# Patient Record
Sex: MALE | Race: OTHER
[De-identification: names, ages, dates, MRNs, and addresses within clinical notes are randomized per-mention and may not be internally consistent; named-entity substitution may affect disease eponyms.]

---

## 2018-03-29 ENCOUNTER — HOSPITAL ENCOUNTER (EMERGENCY)
Dept: HOSPITAL 94 - ER | Age: 23
Discharge: HOME | End: 2018-03-29
Payer: MEDICAID

## 2018-03-29 VITALS — SYSTOLIC BLOOD PRESSURE: 118 MMHG | DIASTOLIC BLOOD PRESSURE: 61 MMHG

## 2018-03-29 VITALS — HEIGHT: 68 IN | BODY MASS INDEX: 21.22 KG/M2 | WEIGHT: 139.99 LBS

## 2018-03-29 DIAGNOSIS — M79.672: Primary | ICD-10-CM

## 2018-03-29 PROCEDURE — 73630 X-RAY EXAM OF FOOT: CPT

## 2018-03-29 PROCEDURE — 99284 EMERGENCY DEPT VISIT MOD MDM: CPT

## 2023-01-13 ENCOUNTER — OFFICE VISIT (OUTPATIENT)
Dept: URGENT CARE | Facility: PHYSICIAN GROUP | Age: 28
End: 2023-01-13
Payer: COMMERCIAL

## 2023-01-13 VITALS
RESPIRATION RATE: 15 BRPM | HEIGHT: 68 IN | WEIGHT: 131 LBS | OXYGEN SATURATION: 98 % | DIASTOLIC BLOOD PRESSURE: 64 MMHG | TEMPERATURE: 98.2 F | BODY MASS INDEX: 19.85 KG/M2 | HEART RATE: 81 BPM | SYSTOLIC BLOOD PRESSURE: 100 MMHG

## 2023-01-13 DIAGNOSIS — R10.9 ABDOMINAL CRAMPING: ICD-10-CM

## 2023-01-13 DIAGNOSIS — R19.7 DIARRHEA, UNSPECIFIED TYPE: ICD-10-CM

## 2023-01-13 DIAGNOSIS — R11.2 NAUSEA AND VOMITING, UNSPECIFIED VOMITING TYPE: ICD-10-CM

## 2023-01-13 PROCEDURE — 99203 OFFICE O/P NEW LOW 30 MIN: CPT | Performed by: PHYSICIAN ASSISTANT

## 2023-01-13 RX ORDER — ONDANSETRON 4 MG/1
4 TABLET, ORALLY DISINTEGRATING ORAL EVERY 6 HOURS PRN
Qty: 15 TABLET | Refills: 0 | Status: SHIPPED | OUTPATIENT
Start: 2023-01-13 | End: 2023-03-25

## 2023-01-13 NOTE — LETTER
January 13, 2023    To Whom It May Concern:         This is confirmation that Samia Rodriguez attended his scheduled appointment with Terry Mejia P.A.-C. on 1/13/23. Please excuse him on 1/13/23 from work. He may return to work next scheduled shift.          If you have any questions please do not hesitate to call me at the phone number listed below.     Sincerely,        Terry Mejia P.A.-C.   [phone]

## 2023-01-14 NOTE — PROGRESS NOTES
"Subjective:   Samia Rodriguez is a 27 y.o. male who presents for Other (GI issues since Wednesday. Pt states that he has had gas and abnormal bowel movements. Pt states that he has been vomiting after eating. )      HPI  The patient presents to the Urgent Care with GI symptoms onset 2 days ago.  Symptoms started with increased gas and a churning sensation to his abdomen.  Frequent visits to the toilet and bowel movements soft small pieces that are not liquid.  Denies any known bloody or black stools.  He had some nausea and vomiting twice last night after eating a burger.  He still has an appetite.  He tolerated fluids well.  Pepto-Bismol helps. Denies any SOB, bloody or black stools. No recent travel. Denies any pertinent past medical history. Denies any abdominal surgeries.       Medications:    This patient does not have an active medication from one of the medication groupers.    Allergies: Patient has no known allergies.    Problem List: Samia Rodriguez does not have a problem list on file.    Surgical History:  No past surgical history on file.    Past Social Hx: Samia Rodriguez  reports that he has been smoking cigarettes. He started smoking about 4 years ago. He has never used smokeless tobacco. He reports current alcohol use. He reports current drug use. Drugs: Marijuana and Inhaled.     Past Family Hx:  Samia Rodriguez family history is not on file.     Problem list, medications, and allergies reviewed by myself today in Epic.     Objective:     /64 (BP Location: Left arm, Patient Position: Sitting, BP Cuff Size: Adult)   Pulse 81   Temp 36.8 °C (98.2 °F) (Temporal)   Resp 15   Ht 1.727 m (5' 8\")   Wt 59.4 kg (131 lb)   SpO2 98%   BMI 19.92 kg/m²     Physical Exam  Vitals reviewed.   Constitutional:       General: He is not in acute distress.     Appearance: Normal appearance. He is not ill-appearing or toxic-appearing.   HENT:      Mouth/Throat:      Mouth: Mucous membranes are moist.      " Pharynx: Oropharynx is clear.   Eyes:      Conjunctiva/sclera: Conjunctivae normal.      Pupils: Pupils are equal, round, and reactive to light.   Cardiovascular:      Rate and Rhythm: Normal rate and regular rhythm.      Heart sounds: Normal heart sounds.   Pulmonary:      Effort: Pulmonary effort is normal.      Breath sounds: Normal breath sounds.   Abdominal:      General: Abdomen is flat. Bowel sounds are normal. There is no distension.      Palpations: There is no hepatomegaly, splenomegaly or mass.      Tenderness: There is abdominal tenderness (mild) in the left upper quadrant. There is no right CVA tenderness, left CVA tenderness, guarding or rebound. Negative signs include Mcdermott's sign and McBurney's sign.       Musculoskeletal:      Cervical back: Neck supple. No rigidity.   Lymphadenopathy:      Cervical: No cervical adenopathy.   Skin:     General: Skin is warm and dry.   Neurological:      General: No focal deficit present.      Mental Status: He is alert and oriented to person, place, and time.   Psychiatric:         Mood and Affect: Mood normal.         Behavior: Behavior normal.       Diagnosis and associated orders:     1. Nausea and vomiting, unspecified vomiting type  - ondansetron (ZOFRAN ODT) 4 MG TABLET DISPERSIBLE; Take 1 Tablet by mouth every 6 hours as needed for Nausea/Vomiting.  Dispense: 15 Tablet; Refill: 0    2. Diarrhea, unspecified type    3. Abdominal cramping       Comments/MDM:     Patient's presenting symptoms and exam findings consistent most likely with a viral gastroenteritis.  Differential diagnosis include but not limited to constipation, IBS, colitis, appendicitis.  Patient overall is well-appearing in no acute distress.  No peritoneal signs.  Afebrile.  Tolerating fluids.  Suspicion for acute emergent pathology is low.  Recommend symptomatic and supportive care at this time as well as Zofran for any nausea or vomiting.  Increase fluid intake, bland/brat diet and increase  as tolerated.  Patient understands to present immediately to the ER for any severe abdominal pain, continuous vomiting, unable to tolerate fluids, or any other concerns.       I personally reviewed prior external notes and test results pertinent to today's visit. Pathogenesis of diagnosis discussed including typical length and natural progression. Supportive care, natural history, differential diagnoses, and indications for immediate follow-up discussed. Patient expresses understanding and agrees to plan. Patient denies any other questions or concerns.     Follow-up with the primary care physician for recheck, reevaluation, and consideration of further management.    Please note that this dictation was created using voice recognition software. I have made a reasonable attempt to correct obvious errors, but I expect that there are errors of grammar and possibly content that I did not discover before finalizing the note.    This note was electronically signed by Terry Mejia PA-C

## 2023-01-26 ENCOUNTER — HOSPITAL ENCOUNTER (OUTPATIENT)
Facility: MEDICAL CENTER | Age: 28
End: 2023-01-26
Attending: PHYSICIAN ASSISTANT
Payer: COMMERCIAL

## 2023-01-26 ENCOUNTER — OFFICE VISIT (OUTPATIENT)
Dept: URGENT CARE | Facility: PHYSICIAN GROUP | Age: 28
End: 2023-01-26
Payer: COMMERCIAL

## 2023-01-26 VITALS
SYSTOLIC BLOOD PRESSURE: 102 MMHG | DIASTOLIC BLOOD PRESSURE: 64 MMHG | BODY MASS INDEX: 19.85 KG/M2 | WEIGHT: 131 LBS | TEMPERATURE: 97.6 F | HEART RATE: 101 BPM | OXYGEN SATURATION: 96 % | HEIGHT: 68 IN | RESPIRATION RATE: 16 BRPM

## 2023-01-26 DIAGNOSIS — R13.10 PAINFUL SWALLOWING: ICD-10-CM

## 2023-01-26 LAB
INT CON NEG: NORMAL
INT CON POS: NORMAL
S PYO AG THROAT QL: NEGATIVE

## 2023-01-26 PROCEDURE — 99213 OFFICE O/P EST LOW 20 MIN: CPT | Performed by: PHYSICIAN ASSISTANT

## 2023-01-26 PROCEDURE — 87070 CULTURE OTHR SPECIMN AEROBIC: CPT

## 2023-01-26 PROCEDURE — 87880 STREP A ASSAY W/OPTIC: CPT | Performed by: PHYSICIAN ASSISTANT

## 2023-01-26 ASSESSMENT — ENCOUNTER SYMPTOMS
PALPITATIONS: 0
COUGH: 0
FEVER: 0
DIZZINESS: 0
HEADACHES: 1
CHILLS: 0
SORE THROAT: 1
BLURRED VISION: 0
SHORTNESS OF BREATH: 0
DOUBLE VISION: 0

## 2023-01-26 NOTE — LETTER
January 26, 2023         Patient: Samia Rodriguez   YOB: 1995   Date of Visit: 1/26/2023           To Whom it May Concern:    Samia Rodriguez was seen in my clinic on 1/26/2023.  Please excuse patient's absence today.    If you have any questions or concerns, please don't hesitate to call.        Sincerely,           Carlin Ferrera P.A.-C.  Electronically Signed

## 2023-01-27 DIAGNOSIS — R13.10 PAINFUL SWALLOWING: ICD-10-CM

## 2023-01-27 NOTE — PROGRESS NOTES
"  Subjective:   Samia Rodriguez is a 27 y.o. male who presents today with   Chief Complaint   Patient presents with    Neck Pain     Neck pain? Pain when swallowing, pain radiates to back of head, started this morning      Pharyngitis   This is a new problem. The current episode started today. The problem has been unchanged. There has been no fever. Associated symptoms include headaches. Pertinent negatives include no coughing or shortness of breath. He has tried nothing for the symptoms. The treatment provided no relief.   Patient notes that the pain is worse when he swallows.  Patient denies any recent injury or trauma.    PMH:  has no past medical history on file.  MEDS:   Current Outpatient Medications:     ondansetron (ZOFRAN ODT) 4 MG TABLET DISPERSIBLE, Take 1 Tablet by mouth every 6 hours as needed for Nausea/Vomiting. (Patient not taking: Reported on 1/26/2023), Disp: 15 Tablet, Rfl: 0  ALLERGIES: No Known Allergies  SURGHX: History reviewed. No pertinent surgical history.  SOCHX:  reports that he has been smoking cigarettes. He started smoking about 4 years ago. He has never used smokeless tobacco. He reports current alcohol use. He reports current drug use. Drugs: Marijuana and Inhaled.  FH: Reviewed with patient, not pertinent to this visit.       Review of Systems   Constitutional:  Negative for chills and fever.   HENT:  Positive for sore throat.    Eyes:  Negative for blurred vision and double vision.   Respiratory:  Negative for cough and shortness of breath.    Cardiovascular:  Negative for chest pain and palpitations.   Neurological:  Positive for headaches. Negative for dizziness.      Objective:   /64 (BP Location: Right arm, Patient Position: Sitting, BP Cuff Size: Adult)   Pulse (!) 101   Temp 36.4 °C (97.6 °F) (Temporal)   Resp 16   Ht 1.727 m (5' 8\")   Wt 59.4 kg (131 lb)   SpO2 96%   BMI 19.92 kg/m²   Physical Exam  Vitals and nursing note reviewed.   Constitutional:       " General: He is not in acute distress.     Appearance: Normal appearance. He is well-developed. He is not ill-appearing or toxic-appearing.   HENT:      Head: Normocephalic and atraumatic.      Right Ear: Hearing normal.      Left Ear: Hearing normal.      Mouth/Throat:      Dentition: Normal dentition. No dental tenderness, gingival swelling or dental abscesses.      Pharynx: Uvula midline. Posterior oropharyngeal erythema present. No uvula swelling.      Tonsils: No tonsillar exudate or tonsillar abscesses. 1+ on the right. 2+ on the left.      Comments: No signs of abscess to the inside of his mouth.  Neck:      Comments: Mild pain with movement to the right.  No pulsating mass noted to the neck.  Cardiovascular:      Rate and Rhythm: Regular rhythm.      Heart sounds: Normal heart sounds.   Pulmonary:      Effort: Pulmonary effort is normal.   Musculoskeletal:      Cervical back: Normal range of motion. No spinous process tenderness or muscular tenderness.      Comments: Normal movement in all 4 extremities   Lymphadenopathy:      Head:      Left side of head: Tonsillar adenopathy present.   Skin:     General: Skin is warm and dry.   Neurological:      General: No focal deficit present.      Mental Status: He is alert and oriented to person, place, and time.      GCS: GCS eye subscore is 4. GCS verbal subscore is 5. GCS motor subscore is 6.      Cranial Nerves: No dysarthria or facial asymmetry.      Motor: Motor function is intact.      Coordination: Coordination is intact. Coordination normal.   Psychiatric:         Mood and Affect: Mood normal.     STREP A -    Assessment/Plan:   Assessment    1. Painful swallowing  - POCT Rapid Strep A  - CULTURE THROAT; Future  Symptoms and presentation appear to be consistent with viral pharyngitis and would recommend over-the-counter use of ibuprofen or Tylenol per 's instructions as well as salt water gargles and lozenges.  We will follow-up with throat  culture and treat accordingly if needed at that time.    Differential diagnosis, natural history, supportive care, and indications for immediate follow-up discussed.   Patient given instructions and understanding of medications and treatment.    If not improving in 3-5 days, F/U with PCP or return to UC if symptoms worsen.    Patient agreeable to plan.      Please note that this dictation was created using voice recognition software. I have made every reasonable attempt to correct obvious errors, but I expect that there are errors of grammar and possibly content that I did not discover before finalizing the note.    Carlin Ferrera PA-C

## 2023-01-29 LAB
BACTERIA SPEC RESP CULT: NORMAL
SIGNIFICANT IND 70042: NORMAL
SITE SITE: NORMAL
SOURCE SOURCE: NORMAL

## 2023-03-24 ENCOUNTER — OFFICE VISIT (OUTPATIENT)
Dept: URGENT CARE | Facility: CLINIC | Age: 28
End: 2023-03-24
Payer: COMMERCIAL

## 2023-03-24 VITALS
DIASTOLIC BLOOD PRESSURE: 80 MMHG | HEIGHT: 68 IN | WEIGHT: 136.4 LBS | HEART RATE: 72 BPM | TEMPERATURE: 98.4 F | SYSTOLIC BLOOD PRESSURE: 106 MMHG | BODY MASS INDEX: 20.67 KG/M2 | OXYGEN SATURATION: 96 %

## 2023-03-24 DIAGNOSIS — K92.1 BLOODY STOOLS: ICD-10-CM

## 2023-03-24 DIAGNOSIS — K59.00 CONSTIPATION, UNSPECIFIED CONSTIPATION TYPE: ICD-10-CM

## 2023-03-24 PROCEDURE — 99214 OFFICE O/P EST MOD 30 MIN: CPT | Performed by: NURSE PRACTITIONER

## 2023-03-24 RX ORDER — BIOTIN 10 MG
TABLET ORAL
COMMUNITY
End: 2023-07-06

## 2023-03-24 RX ORDER — MULTIVIT WITH MINERALS/LUTEIN
TABLET ORAL
COMMUNITY

## 2023-03-24 NOTE — LETTER
March 24, 2023         Patient: Samia Rodriguez   YOB: 1995   Date of Visit: 3/24/2023           To Whom it May Concern:    Samia Rodriguez was seen in my clinic on 3/24/2023. He may return to work on 3/26/23.    If you have any questions or concerns, please don't hesitate to call.        Sincerely,           RUIZ Hernandez.  Electronically Signed

## 2023-03-25 ASSESSMENT — ENCOUNTER SYMPTOMS
BACK PAIN: 0
FEVER: 0
NAUSEA: 0
DIARRHEA: 0
CONSTIPATION: 1
CHILLS: 0
COUGH: 0
HEARTBURN: 0
ABDOMINAL PAIN: 0
BLOOD IN STOOL: 1
FLANK PAIN: 0
VOMITING: 0

## 2023-03-25 NOTE — PROGRESS NOTES
Subjective:   Samia Rodriguez is a 27 y.o. male who presents for Stool Color Change (The patient stated he hasn't been eating very much lately, mostly due to the lack of interest. Today he had a bowel movement and noticed blood.  There was no blood in the actual toilet water. He did say that he has to maneuver himself a bit to have a bowel movement.)      HPI  Patient is a 27-year-old male presents urgent care for evaluation after he noticed blood in his stools today after a bowel movement.  Patient states that the blood did appear to be darker in nature.  He does admit that he is struggles with intermittent constipation and does have straining with bowel movements at times.  He denies any fever or chills.  He denies any history of anemia, does not bruise easily.  Did not feel dizzy, fatigued.  Patient denies any chest pain or palpitations.  Denies any acute abdominal tenderness.  Denies any nausea or vomiting.  He does note that over the past couple days he is not wanting to eat or drink much has lack of interest of eating.  Patient has no history of hemorrhoids.  Denies pain with defecation.  Denies any anal itching  Review of Systems   Constitutional:  Negative for chills and fever.   HENT:  Negative for congestion.    Respiratory:  Negative for cough.    Cardiovascular:  Negative for chest pain.   Gastrointestinal:  Positive for blood in stool and constipation. Negative for abdominal pain, diarrhea, heartburn, melena, nausea and vomiting.   Genitourinary:  Negative for dysuria and flank pain.   Musculoskeletal:  Negative for back pain.   Skin:  Negative for rash.     Medications:    Biotin Tabs  Vitamin C Tabs    Allergies: Patient has no known allergies.    Problem List: Samia Rodriguez does not have a problem list on file.    Surgical History:  No past surgical history on file.    Past Social Hx: Samia Rodriguez  reports that he has been smoking cigarettes. He started smoking about 4 years ago. He has never  "used smokeless tobacco. He reports current alcohol use. He reports current drug use. Drugs: Marijuana and Inhaled.     Past Family Hx:  Samia Rodriguez family history is not on file.     Problem list, medications, and allergies reviewed by myself today in Epic.     Objective:     /80 (BP Location: Right arm, Patient Position: Sitting, BP Cuff Size: Adult)   Pulse 72   Temp 36.9 °C (98.4 °F) (Temporal)   Ht 1.727 m (5' 8\")   Wt 61.9 kg (136 lb 6.4 oz)   SpO2 96%   BMI 20.74 kg/m²     Physical Exam  Vitals and nursing note reviewed.   Constitutional:       General: He is not in acute distress.     Appearance: He is well-developed.   HENT:      Head: Normocephalic and atraumatic.      Right Ear: External ear normal.      Left Ear: External ear normal.      Nose: Nose normal.      Mouth/Throat:      Mouth: Mucous membranes are moist.   Eyes:      Conjunctiva/sclera: Conjunctivae normal.   Cardiovascular:      Rate and Rhythm: Normal rate.   Pulmonary:      Effort: Pulmonary effort is normal. No respiratory distress.      Breath sounds: Normal breath sounds.   Abdominal:      General: There is no distension.   Genitourinary:     Rectum: Normal.   Musculoskeletal:         General: Normal range of motion.   Skin:     General: Skin is warm and dry.   Neurological:      General: No focal deficit present.      Mental Status: He is alert and oriented to person, place, and time. Mental status is at baseline.      Gait: Gait (gait at baseline) normal.   Psychiatric:         Judgment: Judgment normal.       Assessment/Plan:     Diagnosis and associated orders:     1. Bloody stools  Referral to Gastroenterology    OCCULT BLOOD FECES IMMUNOASSAY      2. Constipation, unspecified constipation type           Comments/MDM:     I personally reviewed prior external notes and prior test results pertinent to today's visit.  On exam no acute findings, did recommend dietary modifications, recommend over-the-counter stool " softeners.  We will evaluate occult blood to rule out GI bleed.  Patient will be referred to gastroenterology.  Discussed management options, risks and benefits, and alternatives to treatment plan agreed upon.   Red flags discussed and indications to immediately call 911 or present to the Emergency Department.   Supportive care, differential diagnoses, and indications for immediate follow-up discussed with patient.    Patient expresses understanding and agrees to plan. Patient denies any other questions or concerns.            My total time spent caring for the patient on the day of the encounter was 31 minutes.   This does not include time spent on separately billable procedures/tests.    Please note that this dictation was created using voice recognition software. I have made a reasonable attempt to correct obvious errors, but I expect that there are errors of grammar and possibly content that I did not discover before finalizing the note.    This note was electronically signed by Hipolito DORMAN.

## 2023-04-01 ENCOUNTER — OFFICE VISIT (OUTPATIENT)
Dept: URGENT CARE | Facility: CLINIC | Age: 28
End: 2023-04-01
Payer: COMMERCIAL

## 2023-04-01 VITALS
BODY MASS INDEX: 20.31 KG/M2 | OXYGEN SATURATION: 97 % | HEIGHT: 68 IN | WEIGHT: 134 LBS | RESPIRATION RATE: 16 BRPM | TEMPERATURE: 96.7 F | HEART RATE: 66 BPM | DIASTOLIC BLOOD PRESSURE: 64 MMHG | SYSTOLIC BLOOD PRESSURE: 108 MMHG

## 2023-04-01 DIAGNOSIS — K92.1 BLOOD IN STOOL: ICD-10-CM

## 2023-04-01 PROCEDURE — 99213 OFFICE O/P EST LOW 20 MIN: CPT | Performed by: NURSE PRACTITIONER

## 2023-04-01 NOTE — LETTER
April 1, 2023        Samia Rodriguez  [address]  [facility] [address]        Samia was seen in our clinic today and he is excused from work.  If you have any questions or concerns, please don't hesitate to call.        Sincerely,        TAWNY Brown.PTRISHA.    Electronically Signed

## 2023-04-12 ASSESSMENT — ENCOUNTER SYMPTOMS
HEADACHES: 0
FEVER: 0
NAUSEA: 0
BLOOD IN STOOL: 1
VOMITING: 0
CHILLS: 0
ABDOMINAL PAIN: 0
DIZZINESS: 0
CONSTIPATION: 1
MYALGIAS: 0

## 2023-04-12 NOTE — PROGRESS NOTES
Subjective     Samia Rodriguez is a 27 y.o. male who presents with Bowel Problem            HPI  Recurrent problem.  Patient is a 27-year-old male who presents with continued blood in his stool.  He was seen approximately 1 week ago for similar symptoms and has a working referral to gastroenterology as well as an order for fecal occult blood.  He presents with having a couple of episodes of bright red blood on toilet paper and in the toilet.  He does have a history of constipation.  He denies any abdominal pain, nausea, fever, or chills.    Patient has no known allergies.  Current Outpatient Medications on File Prior to Visit   Medication Sig Dispense Refill    Biotin 10 MG Tab Take  by mouth.      Ascorbic Acid (VITAMIN C) 1000 MG Tab Take  by mouth.       No current facility-administered medications on file prior to visit.     Social History     Socioeconomic History    Marital status: Single     Spouse name: Not on file    Number of children: Not on file    Years of education: Not on file    Highest education level: Not on file   Occupational History    Not on file   Tobacco Use    Smoking status: Every Day     Types: Cigarettes     Start date: 2019    Smokeless tobacco: Never   Vaping Use    Vaping Use: Every day    Substances: Nicotine, THC    Devices: Disposable   Substance and Sexual Activity    Alcohol use: Yes     Comment: Rare use    Drug use: Yes     Types: Marijuana, Inhaled    Sexual activity: Yes     Partners: Female     Birth control/protection: Condom Male   Other Topics Concern    Not on file   Social History Narrative    Not on file     Social Determinants of Health     Financial Resource Strain: Not on file   Food Insecurity: Not on file   Transportation Needs: Not on file   Physical Activity: Not on file   Stress: Not on file   Social Connections: Not on file   Intimate Partner Violence: Not on file   Housing Stability: Not on file     Breast Cancer-related family history is not on  "file.      Review of Systems   Constitutional:  Negative for chills and fever.   Gastrointestinal:  Positive for blood in stool and constipation. Negative for abdominal pain, nausea and vomiting.   Genitourinary: Negative.    Musculoskeletal:  Negative for myalgias.   Neurological:  Negative for dizziness and headaches.            Objective     /64   Pulse 66   Temp 35.9 °C (96.7 °F) (Temporal)   Resp 16   Ht 1.72 m (5' 7.72\")   Wt 60.8 kg (134 lb)   SpO2 97%   BMI 20.55 kg/m²      Physical Exam  Vitals reviewed.   Constitutional:       General: He is not in acute distress.     Appearance: He is well-developed.   Cardiovascular:      Rate and Rhythm: Normal rate and regular rhythm.      Heart sounds: Normal heart sounds. No murmur heard.  Pulmonary:      Effort: Pulmonary effort is normal. No respiratory distress.      Breath sounds: Normal breath sounds.   Abdominal:      General: Bowel sounds are normal.      Palpations: Abdomen is soft.      Tenderness: There is no abdominal tenderness.   Genitourinary:     Comments: Deferred.  Musculoskeletal:         General: Normal range of motion.      Comments: Moves all 4 extremities normally   Skin:     General: Skin is warm and dry.   Neurological:      Mental Status: He is alert and oriented to person, place, and time.   Psychiatric:         Behavior: Behavior normal.         Thought Content: Thought content normal.                           Assessment & Plan        1. Blood in stool  OCCULT BLOOD FECES IMMUNOASSAY        Patient continued advisement on follow-up with gastroenterology.  I have renewed the order for his occult blood test as his address is changed and he is worried that this will not get to him.  He is advised to continue monitoring his symptoms and if he has active bright red bleeding he is advised on emergency room evaluation.                "

## 2023-07-06 ENCOUNTER — OCCUPATIONAL MEDICINE (OUTPATIENT)
Dept: URGENT CARE | Facility: CLINIC | Age: 28
End: 2023-07-06
Payer: COMMERCIAL

## 2023-07-06 VITALS
DIASTOLIC BLOOD PRESSURE: 54 MMHG | BODY MASS INDEX: 19.72 KG/M2 | SYSTOLIC BLOOD PRESSURE: 108 MMHG | RESPIRATION RATE: 16 BRPM | TEMPERATURE: 98.3 F | HEART RATE: 65 BPM | WEIGHT: 130.1 LBS | OXYGEN SATURATION: 96 % | HEIGHT: 68 IN

## 2023-07-06 DIAGNOSIS — S96.911A SPRAIN AND STRAIN OF RIGHT ANKLE: ICD-10-CM

## 2023-07-06 DIAGNOSIS — S93.401A SPRAIN AND STRAIN OF RIGHT ANKLE: ICD-10-CM

## 2023-07-06 PROCEDURE — 3074F SYST BP LT 130 MM HG: CPT | Performed by: NURSE PRACTITIONER

## 2023-07-06 PROCEDURE — 3078F DIAST BP <80 MM HG: CPT | Performed by: NURSE PRACTITIONER

## 2023-07-06 PROCEDURE — 99213 OFFICE O/P EST LOW 20 MIN: CPT | Performed by: NURSE PRACTITIONER

## 2023-07-06 ASSESSMENT — VISUAL ACUITY: OU: 1

## 2023-07-06 ASSESSMENT — ENCOUNTER SYMPTOMS
CONSTITUTIONAL NEGATIVE: 1
NEUROLOGICAL NEGATIVE: 1

## 2023-07-06 NOTE — LETTER
[address] [place] [address]  Phone:  [phone] - Fax:  [phone]   [facility] Progress Report and Disability Certification  Date of Service: 7/6/2023   No Show:  No  Date / Time of Next Visit: 7/9/2023   Claim Information   Patient Name: Samia Rodriguez  Claim Number:     Employer: [employer]  Date of Injury: 7/6/2023     Insurer / TPA: [employer]  ID / SSN:     Occupation:   Diagnosis: The encounter diagnosis was Sprain and strain of right ankle.    Medical Information   Related to Industrial Injury? Yes    Subjective Complaints:  DOI: 7/6/2023 @ 3:35 AM. Initial visit.    Patient works as a  at [employer].  He was engaging the brake of a [employer] that was jammed requiring excess amount of force to dislodge it.  Reports he had to stand on the brake with the ball of his right foot putting all of his body weight on it to actuate it when it suddenly moved down about 3-4 inches.  Felt sudden pain at the right anterior, anterolateral, and posterior ankle.  Worsens with weight bearing and flexion/dorsiflexion of the foot.  Was evaluated by care on site.  Has been icing and using elastic bandage.  Reports previous history of right ankle sprain.  No second job.   Objective Findings: Constitutional:       General: He is not in acute distress.     Appearance: He is well-developed. He is not ill-appearing or toxic-appearing.   Musculoskeletal:         General: No deformity.      Right lower leg: No swelling or deformity.      Right ankle: No swelling, deformity, ecchymosis or lacerations. Tenderness (Anterior) present over the ATF ligament. No lateral malleolus or medial malleolus tenderness. Decreased range of motion (Limited by pain). Anterior drawer test negative.      Right Achilles Tendon: Tenderness (Mild) present. No defects. Luther's test negative.      Right foot: Normal range of motion. No  swelling, deformity, laceration or tenderness. Normal pulse.   Skin:     General: Skin is warm and dry.      Coloration: Skin is not pale.      Findings: No bruising, erythema or rash.   Neurological:      Mental Status: He is alert and oriented to person, place, and time.      Motor: No weakness.    Pre-Existing Condition(s):     Assessment:   Initial Visit    Status: Additional Care Required  Permanent Disability:No    Plan:      Diagnostics: X-ray    Comments:  Initial visit.  X-ray of right ankle pending.  Rest, ice, compression, and elevation (RICE) and over-the-counter acetaminophen alternating with ibuprofen, per 's instructions, as needed for pain.  Elastic bandage applied.  Walking boot applied.  Work restrictions.  Follow up in 3 days.  Seek immediate medical attention if symptoms change/worsen.     Disability Information   Status: Released to Restricted Duty    From:  2023  Through: 2023 Restrictions are: Temporary   Physical Restrictions   Sitting:    Standing:  < or = to 1 hr/day Stooping:    Bending:      Squatting:    Walking:  < or = to 1 hr/day Climbin hrs/day Pushing:      Pulling:    Other:    Reaching Above Shoulder (L):   Reaching Above Shoulder (R):       Reaching Below Shoulder (L):    Reaching Below Shoulder (R):      Not to exceed Weight Limits   Carrying(hrs):   Weight Limit(lb): < or = to 10 pounds Lifting(hrs):   Weight  Limit(lb): < or = to 10 pounds   Comments: Must wear walking boot    Repetitive Actions   Hands: i.e. Fine Manipulations from Grasping:     Feet: i.e. Operating Foot Controls: 0 hrs/day   Driving / Operate Machinery: 0 hrs/day   Health Care Provider’s Original or Electronic Signature  BENNIE Shen Health Care Provider’s Original or Electronic Signature    Yadiel Samano DO MPH     Clinic Name / Location: [address] [facility]  [place] [address] Clinic Phone Number: Dept: [phone]   Appointment Time:  7:15 Pm Visit Start Time: 8:13 PM   Check-In Time:  8:02 Pm Visit Discharge Time:  9:05 PM   Original-Treating Physician or Chiropractor    Page 2-Insurer/TPA    Page 3-Employer    Page 4-Employee

## 2023-07-06 NOTE — LETTER
July 6, 2023         Patient: Samia Rodriguez   YOB: 1995   Date of Visit: 7/6/2023           To Whom it May Concern:    Samia Rodriguez was seen in my clinic on 7/6/2023 due to illness. Due to medical necessity, please excuse patient from work 7/6/2023.    If you have any questions or concerns, please don't hesitate to call.        Sincerely,         Greg Preston A.P.R.N.  Electronically Signed

## 2023-07-06 NOTE — LETTER
"EMPLOYEE’S CLAIM FOR COMPENSATION/ REPORT OF INITIAL TREATMENT  FORM C-4  PLEASE TYPE OR PRINT    EMPLOYEE’S CLAIM - PROVIDE ALL INFORMATION REQUESTED   First Name  Samia Last Name  Jennifer Birthdate                    1995                Sex  male Claim Number (Insurer’s Use Only)   Home Address  [address] Age  28 y.o. Height  1.727 m (5' 8\") Weight  59 kg (130 lb 1.6 oz) [facility]     [facility] [place]  [address] Telephone  [phone] (home)    Mailing Address  [address] [facility] [address] [facility] [place]  [address] Primary Language Spoken  English    INSURER   THIRD-PARTY     [employer]   Employee's Occupation (Job Title) When Injury or Occupational Disease Occurred      Employer's Name/Company Name  [employer]  Telephone  [phone]    Office Mail Address (Number and Street)  [address]        Date of Injury  7/6/2023               Hours Injury  3:35 AM Date Employer Notified  7/6/2023 Last Day of Work after Injury or Occupational Disease  7/6/2023 Supervisor to Whom Injury     Reported  [facility]   Address or Location of Accident (if applicable)  Work [1]   What were you doing at the time of accident? (if applicable)  engaging the break of a MegaKart    How did this injury or occupational disease occur? (Be specific and answer in detail. Use additional sheet if necessary)  the break of the MegaKart was jammed requiring an excess amount of force to dislodge it. I had to stand on the break to put my body weight into actuating the break when I had foreit the idea of the break fuctioning, it gave way, ushering my full body weight to be caught by my poorly positioned right ankle   If you believe that you have an occupational disease, when did you first have knowledge of the disability and its relationship to your employment?  n/a Witnesses to the Accident (if " applicable)  n/a      Nature of Injury or Occupational Disease  Workers' Compensation  Part(s) of Body Injured or Affected  Ankle (R), N/A, N/A    I CERTIFY THAT THE ABOVE IS TRUE AND CORRECT TO T HE BEST OF MY KNOWLEDGE AND THAT I HAVE PROVIDED THIS INFORMATION IN ORDER TO OBTAIN THE BENEFITS OF [place], OR CHAPTER [place]).  I HEREBY AUTHORIZE ANY PHYSICIAN, CHIROPRACTOR, SURGEON, PRACTITIONER OR ANY OTHER PERSON, ANY HOSPITAL, INCLUDING [facility] OR [facility], ANY  MEDICAL SERVICE ORGANIZATION, ANY INSURANCE COMPANY, OR OTHER INSTITUTION OR ORGANIZATION TO RELEASE TO EACH OTHER, ANY MEDICAL OR OTHER INFORMATION, INCLUDING BENEFITS PAID OR PAYABLE, PERTINENT TO THIS INJURY OR DISEASE, EXCEPT INFORMATION RELATIVE TO DIAGNOSIS, TREATMENT AND/OR COUNSELING FOR AIDS, PSYCHOLOGICAL CONDITIONS, ALCOHOL OR CONTROLLED SUBSTANCES, FOR WHICH I MUST GIVE SPECIFIC AUTHORIZATION.  A PHOTOSTAT OF THIS AUTHORIZATION SHALL BE VALID AS THE ORIGINAL.       Date   Place Employee’s Original or  *Electronic Signature   THIS REPORT MUST BE COMPLETED AND MAILED WITHIN 3 WORKING DAYS OF TREATMENT   Place  [facility]  Name of [facility]   Date  7/6/2023 Diagnosis and Description of Injury or Occupational Disease  (S93.401A,  S96.911A) Sprain and strain of right ankle Is there evidence that the injured employee was under the influence of alcohol and/or another controlled substance at the time of accident?  ? No ? Yes (if yes, please explain)   Hour  8:13 PM   The encounter diagnosis was Sprain and strain of right ankle. No   Treatment  Initial visit.  X-ray of right ankle pending.  Rest, ice, compression, and elevation (RICE) and over-the-counter acetaminophen alternating with ibuprofen, per 's instructions, as needed for pain.  Elastic bandage applied.  Walking boot applied.  Work restrictions.   Follow up in 3 days.  Seek immediate medical attention if symptoms change/worsen.   Have you advised the patient to remain off work five days or     more?    X-Ray Findings      ? Yes Indicate dates:   From   To      From information given by the employee, together with medical evidence, can        you directly connect this injury or occupational disease as job incurred?  Yes ? No If no, is the injured employee capable of:  ? full duty  No ? modified duty  Yes   Is additional medical care by a physician indicated?  Yes If modified duty, specify any limitations / restrictions  Per D39   Do you know of any previous injury or disease contributing to this condition or occupational disease?  ? Yes ? No (Explain if yes)                          No   Date  7/6/2023 Print Health Care Provider's  Name  BENNIE Shen I certify that the employer’s copy of  this form was delivered to the employer on:   Address  [address] [address] Insurer’s Use Only     [facility]  [id number]    Provider’s Tax ID Number  [id number] Telephone  Dept: [phone]             Health Care Provider’s Original or Electronic Signature  e-BRITTANY Andino.P.R.NAnthony Degree (MD,DO, DC,PA-C,APRN)  APRN      * Complete and attach Release of Information (Form C-4A) when injured employee signs C-4 Form electronically  ORIGINAL - TREATING HEALTHCARE PROVIDER PAGE 2 - INSURER/TPA PAGE 3 - EMPLOYER PAGE 4 - EMPLOYEE             Form C-4 (rev.08/21)           BRIEF DESCRIPTION OF RIGHTS AND BENEFITS  (Pursuant to [place] 616C.050)    Notice of Injury or Occupational Disease (Incident Report Form C-1): If an injury or occupational disease (OD) arises out of and in the course of employment, you must provide written notice to your employer as soon as practicable, but no later than 7 days after the accident or OD. Your employer shall maintain a sufficient supply of the required forms.    Claim for Compensation (Form C-4): If  "medical treatment is sought, the form C-4 is available at the place of initial treatment. A completed \"Claim for Compensation\" (Form C-4) must be filed within 90 days after an accident or OD. The treating physician or chiropractor must, within 3 working days after treatment, complete and mail to the employer, the employer's insurer and third-party , the Claim for Compensation.    Medical Treatment: If you require medical treatment for your on-the-job injury or OD, you may be required to select a physician or chiropractor from a list provided by your workers’ compensation insurer, if it has contracted with an Organization for Managed Care (MCO) or Preferred Provider Organization (PPO) or providers of health care. If your employer has not entered into a contract with an MCO or PPO, you may select a physician or chiropractor from the Panel of Physicians and Chiropractors. Any medical costs related to your industrial injury or OD will be paid by your insurer.    Temporary Total Disability (TTD): If your doctor has certified that you are unable to work for a period of at least 5 consecutive days, or 5 cumulative days in a 20-day period, or places restrictions on you that your employer does not accommodate, you may be entitled to TTD compensation.    Temporary Partial Disability (TPD): If the wage you receive upon reemployment is less than the compensation for TTD to which you are entitled, the insurer may be required to pay you TPD compensation to make up the difference. TPD can only be paid for a maximum of 24 months.    Permanent Partial Disability (PPD): When your medical condition is stable and there is an indication of a PPD as a result of your injury or OD, within 30 days, your insurer must arrange for an evaluation by a rating physician or chiropractor to determine the degree of your PPD. The amount of your PPD award depends on the date of injury, the results of the PPD evaluation, your age and " wage.    Permanent Total Disability (PTD): If you are medically certified by a treating physician or chiropractor as permanently and totally disabled and have been granted a PTD status by your insurer, you are entitled to receive monthly benefits not to exceed 66 2/3% of your average monthly wage. The amount of your PTD payments is subject to reduction if you previously received a lump-sum PPD award.    Vocational Rehabilitation Services: You may be eligible for vocational rehabilitation services if you are unable to return to the job due to a permanent physical impairment or permanent restrictions as a result of your injury or occupational disease.    Transportation and Per Roxanne Reimbursement: You may be eligible for travel expenses and per roxanne associated with medical treatment.    Reopening: You may be able to reopen your claim if your condition worsens after claim closure.     Appeal Process: If you disagree with a written determination issued by the insurer or the insurer does not respond to your request, you may appeal to the Department of Administration, , by following the instructions contained in your determination letter. You must appeal the determination within 70 days from the date of the determination letter at [address], [address], [place], [place] [address], or [address] [facility] [address], [place] [address]. If you disagree with the  decision, you may appeal to the Department of Administration, . You must file your appeal within 30 days from the date of the  decision letter at [address], [address], [place] [address], or [address] [facility] [facility] [place] [address], [place] [address]. If you disagree with a decision of an , you may file a petition for judicial review with the District Court. You must do so within 30 days of the Appeal Officer’s decision. You may be represented by an   at your own expense or you may contact the NA for possible representation.    [place]  for Injured Workers (NAIW): If you disagree with a  decision, you may request that NAIW represent you without charge at an  Hearing. For information regarding denial of benefits, you may contact the NA at: [address] [facility], [address], [place] [address], [phone], or [address], [place] [address], [phone]) [phone]    To File a Complaint with the Division: If you wish to file a complaint with the  of the Division of Industrial Relations (DIR),  please contact the Workers’ Compensation Section, [address] [facility], [address], [place], [place] [address], telephone (653) [phone], or [address] [facility] [address] [facility] [address], [address], [place], [place] [address], telephone [phone]) [phone].    For assistance with Workers’ Compensation Issues: You may contact the [facility] Office for Consumer Health Assistance, [address] [facility], [facility] [address], [place], [place] [address], Toll Free 1-309.730.9456, Web site: http://[facility].[place].gov/Programs/ELIZABETH E-mail: elizabeth@[facility].[place].[facility]              __________________________________________________________________                                    _________________            Employee Name / Signature                                                                                                                            Date                                                                                                                                                                                                                              D-2 (rev. 10/20)

## 2023-07-07 ENCOUNTER — APPOINTMENT (OUTPATIENT)
Dept: RADIOLOGY | Facility: MEDICAL CENTER | Age: 28
End: 2023-07-07
Attending: EMERGENCY MEDICINE
Payer: COMMERCIAL

## 2023-07-07 ENCOUNTER — HOSPITAL ENCOUNTER (EMERGENCY)
Facility: MEDICAL CENTER | Age: 28
End: 2023-07-07
Attending: EMERGENCY MEDICINE
Payer: COMMERCIAL

## 2023-07-07 VITALS
DIASTOLIC BLOOD PRESSURE: 67 MMHG | OXYGEN SATURATION: 96 % | SYSTOLIC BLOOD PRESSURE: 127 MMHG | TEMPERATURE: 100 F | BODY MASS INDEX: 19.71 KG/M2 | WEIGHT: 130.07 LBS | HEART RATE: 76 BPM | RESPIRATION RATE: 16 BRPM | HEIGHT: 68 IN

## 2023-07-07 DIAGNOSIS — S93.491A SPRAIN OF ANTERIOR TALOFIBULAR LIGAMENT OF RIGHT ANKLE, INITIAL ENCOUNTER: ICD-10-CM

## 2023-07-07 PROCEDURE — 99283 EMERGENCY DEPT VISIT LOW MDM: CPT

## 2023-07-07 PROCEDURE — 73610 X-RAY EXAM OF ANKLE: CPT | Mod: RT

## 2023-07-07 NOTE — PROGRESS NOTES
"Subjective:     Samia Rodriguez is a 28 y.o. male who presents for Other (NEW  DOI: 7/6/23 (R) ankle pain, trouble walking  )    DOI: 7/6/2023 @ 3:35 AM. Initial visit.    Patient works as a  at [employer].  He was engaging the brake of a MegaKart that was jammed requiring excess amount of force to dislodge it.  Reports he had to stand on the brake with the ball of his right foot putting all of his body weight on it to actuate it when it suddenly moved down about 3-4 inches.  Felt sudden pain at the right anterior, anterolateral, and posterior ankle.  Worsens with weight bearing and flexion/dorsiflexion of the foot.  Was evaluated by care on site.  Has been icing and using elastic bandage.  Reports previous history of right ankle sprain.  No second job.    Review of Systems   Constitutional: Negative.    Musculoskeletal:         Ankle injury, pain   Neurological: Negative.    All other systems reviewed and are negative.    Refer to HPI for additional details.    PMH: No pertinent past medical history to this problem  MEDS: Medications were reviewed in Epic  ALLERGIES: Allergies were reviewed in Epic  SOCHX: Works as a  at [employer]   FH: No pertinent family history to this problem      Objective:     /54 (BP Location: Left arm, Patient Position: Sitting, BP Cuff Size: Adult)   Pulse 65   Temp 36.8 °C (98.3 °F) (Temporal)   Resp 16   Ht 1.727 m (5' 8\")   Wt 59 kg (130 lb 1.6 oz)   SpO2 96%   BMI 19.78 kg/m²     Physical Exam  Nursing note reviewed.   Constitutional:       General: He is not in acute distress.     Appearance: He is well-developed. He is not ill-appearing or toxic-appearing.   Eyes:      General: Vision grossly intact.   Cardiovascular:      Rate and Rhythm: Normal rate.      Pulses: Normal pulses.   Pulmonary:      Effort: Pulmonary effort is normal. No respiratory distress.   Musculoskeletal:         General: No deformity.      Right lower leg: No " swelling or deformity.      Right ankle: No swelling, deformity, ecchymosis or lacerations. Tenderness (Anterior) present over the ATF ligament. No lateral malleolus or medial malleolus tenderness. Decreased range of motion (Limited by pain). Anterior drawer test negative.      Right Achilles Tendon: Tenderness (Mild) present. No defects. Luther's test negative.      Right foot: Normal range of motion. No swelling, deformity, laceration or tenderness. Normal pulse.   Skin:     General: Skin is warm and dry.      Coloration: Skin is not pale.      Findings: No bruising, erythema or rash.   Neurological:      Mental Status: He is alert and oriented to person, place, and time.      Motor: No weakness.   Psychiatric:         Behavior: Behavior normal. Behavior is cooperative.     Imaging not available on site today. All other outpatient sites already closed.      Assessment/Plan:     1. Sprain and strain of right ankle  - DX-ANKLE 3+ VIEWS RIGHT; Future    Initial visit.  X-ray of right ankle pending.  Patient advised to obtain this at alternative outpatient site with imaging.  Rest, ice, compression, and elevation (RICE) and over-the-counter acetaminophen alternating with ibuprofen, per 's instructions, as needed for pain.  Elastic bandage applied.  Walking boot applied.  Work restrictions.  Follow up in 3 days.  Seek immediate medical attention if symptoms change/worsen.     Differential diagnosis, natural history, supportive care, over-the-counter symptom management per 's instructions, close monitoring, and indications for immediate follow-up discussed.     All questions answered. Patient agrees with the plan of care.

## 2023-07-07 NOTE — LETTER
[facility], EMERGENCY DEPT   [address] [place], [place] [address]  Phone: Dept: [phone] - Fax:        [facility] Progress Report and Disability Certification  Date of Service: 7/7/2023   No Show:  No  Date / Time of Next Visit: 7/11/2023   Claim Information   Patient Name: Samia Rodriguez  Claim Number:     Employer: [employer]  Date of Injury: 7/6/2023     Insurer / TPA:  [employer]O INSURANCE ID / SSN:  [id number]   Occupation:  Diagnosis: The encounter diagnosis was Sprain of anterior talofibular ligament of right ankle, initial encounter.    Medical Information   Related to Industrial Injury? Yes    Subjective Complaints:  Ankle pain   Objective Findings: Ankle tenderness   Pre-Existing Condition(s):     Assessment:   Initial Visit    Status: Additional Care Required  Permanent Disability:No    Plan:   Comments:Splint    Diagnostics: X-ray    Comments:  Negative    Disability Information   Status: Released to Restricted Duty    From:  7/7/2023  Through: 7/11/2023 Restrictions are: Temporary   Physical Restrictions   Sitting:  Continuously Standing:  Rarely Stooping:  Rarely Bending:      Squatting:  Rarely Walking:  Rarely Climbing:  Never Pushing:  Never   Pulling:    Other:    Reaching Above Shoulder (L):   Reaching Above Shoulder (R):       Reaching Below Shoulder (L):    Reaching Below Shoulder (R):      Not to exceed Weight Limits   Carrying(hrs):   Weight Limit(lb):   Lifting(hrs):   Weight  Limit(lb):     Comments:      Repetitive Actions   Hands: i.e. Fine Manipulations from Grasping:     Feet: i.e. Operating Foot Controls:     Driving / Operate Machinery:     Physician Name: Kg Ramos Physician Signature: KG Butterfield M.D. e-Signature:  , Medical Director   Clinic Name / Location: [facility], EMERGENCY DEPT  [address] [facility] [address] [phone]     Clinic  Phone Number: Dept: [phone]   Appointment Time:  Visit Start Time:    Check-In Time:  5:14 PM Visit Discharge Time:    Original-Treating Physician or Chiropractor    Page 2-Insurer/TPA    Page 3-Employer    Page 4-Employee

## 2023-07-07 NOTE — LETTER
[facility], EMERGENCY DEPT   [address] [place], [place] [address]  Phone: Dept: [phone] - Fax:        [facility] Progress Report and Disability Certification  Date of Service: 7/7/2023   No Show:  No  Date / Time of Next Visit:     Claim Information   Patient Name: Samia Rodriguez  Claim Number:     Employer: [employer]  Date of Injury: 7/6/2023     Insurer / TPA: Radha ID / SSN:    Occupation:  Diagnosis: The encounter diagnosis was Sprain of anterior talofibular ligament of right ankle, initial encounter.    Medical Information   Related to Industrial Injury?      Subjective Complaints:      Objective Findings:     Pre-Existing Condition(s):     Assessment:        Status:    Permanent Disability:     Plan:      Diagnostics:      Comments:       Disability Information   Status:      From:     Through:   Restrictions are:     Physical Restrictions   Sitting:    Standing:    Stooping:    Bending:      Squatting:    Walking:    Climbing:    Pushing:      Pulling:    Other:    Reaching Above Shoulder (L):   Reaching Above Shoulder (R):       Reaching Below Shoulder (L):    Reaching Below Shoulder (R):      Not to exceed Weight Limits   Carrying(hrs):   Weight Limit(lb):   Lifting(hrs):   Weight  Limit(lb):     Comments:      Repetitive Actions   Hands: i.e. Fine Manipulations from Grasping:     Feet: i.e. Operating Foot Controls:     Driving / Operate Machinery:     Physician Name: Kg Ramos Physician Signature: KG Butterfield M.D. e-Signature:  , Medical Director   Clinic Name / Location: [facility], EMERGENCY DEPT  [address] [place] [id number] [phone]     Clinic Phone Number: Dept: [phone]   Appointment Time:  Visit Start Time:    Check-In Time:  5:14 PM Visit Discharge Time:    Original-Treating Physician or Chiropractor    Page 2-Insurer/TPA     Page 3-Employer    Page 4-Employee

## 2023-07-07 NOTE — LETTER
[facility], EMERGENCY DEPT   [address] [place], [place] [address]  Phone: Dept: [phone] - Fax:        [facility] Progress Report and Disability Certification  Date of Service: 7/7/2023   No Show:  No  Date / Time of Next Visit:     Claim Information   Patient Name: Samia Rodriguez  Claim Number:     Employer: [employer]  Date of Injury: 7/6/2023     Insurer / TPA: Almaz ID / SSN: [id number]    Occupation:  Diagnosis: The encounter diagnosis was Sprain of anterior talofibular ligament of right ankle, initial encounter.    Medical Information   Related to Industrial Injury?   ***   Subjective Complaints:      Objective Findings:     Pre-Existing Condition(s):     Assessment:        Status:    Permanent Disability:     Plan:      Diagnostics:      Comments:       Disability Information   Status:      From:     Through:   Restrictions are:     Physical Restrictions   Sitting:    Standing:    Stooping:    Bending:      Squatting:    Walking:    Climbing:    Pushing:      Pulling:    Other:    Reaching Above Shoulder (L):   Reaching Above Shoulder (R):       Reaching Below Shoulder (L):    Reaching Below Shoulder (R):      Not to exceed Weight Limits   Carrying(hrs):   Weight Limit(lb):   Lifting(hrs):   Weight  Limit(lb):     Comments:      Repetitive Actions   Hands: i.e. Fine Manipulations from Grasping:     Feet: i.e. Operating Foot Controls:     Driving / Operate Machinery:     Physician Name: Kg Ramos Physician Signature: KG Butterfield M.D. e-Signature:  , Medical Director   Clinic Name / Location: [facility], EMERGENCY DEPT  [address] [facility] [id number] [phone]     Clinic Phone Number: Dept: [phone]   Appointment Time:  Visit Start Time:    Check-In Time:  5:14 PM Visit Discharge Time:    Original-Treating Physician or Chiropractor    Page  2-Insurer/TPA    Page 3-Employer    Page 4-Employee

## 2023-07-07 NOTE — LETTER
[facility], EMERGENCY DEPT   [address] [place], [place] [address]  Phone: Dept: [phone] - Fax:        [facility] Progress Report and Disability Certification  Date of Service: 7/7/2023   No Show:  No  Date / Time of Next Visit:     Claim Information   Patient Name: Samia Rodriguez  Claim Number:     Employer: [employer]  Date of Injury: 7/6/2023     Insurer / TPA: Almaz ID / SSN: [id number]    Occupation:  Diagnosis: The encounter diagnosis was Sprain of anterior talofibular ligament of right ankle, initial encounter.    Medical Information   Related to Industrial Injury?   ***   Subjective Complaints:      Objective Findings:     Pre-Existing Condition(s):     Assessment:        Status:    Permanent Disability:     Plan:      Diagnostics:      Comments:       Disability Information   Status:      From:     Through:   Restrictions are:     Physical Restrictions   Sitting:    Standing:    Stooping:    Bending:      Squatting:    Walking:    Climbing:    Pushing:      Pulling:    Other:    Reaching Above Shoulder (L):   Reaching Above Shoulder (R):       Reaching Below Shoulder (L):    Reaching Below Shoulder (R):      Not to exceed Weight Limits   Carrying(hrs):   Weight Limit(lb):   Lifting(hrs):   Weight  Limit(lb):     Comments:      Repetitive Actions   Hands: i.e. Fine Manipulations from Grasping:     Feet: i.e. Operating Foot Controls:     Driving / Operate Machinery:     Physician Name: Kg Ramos Physician Signature: KG Butterfield M.D. e-Signature:  , Medical Director   Clinic Name / Location: [facility], EMERGENCY DEPT  [address] [facility] [id number] [phone]     Clinic Phone Number: Dept: [phone]   Appointment Time:  Visit Start Time:    Check-In Time:  5:14 PM Visit Discharge Time:    Original-Treating Physician or Chiropractor    Page  2-Insurer/TPA    Page 3-Employer    Page 4-Employee

## 2023-07-07 NOTE — LETTER
[facility], EMERGENCY DEPT   [address], [place] [address]  Phone: Dept: [phone] - Fax:        [facility] Progress Report and Disability Certification  Date of Service: 7/7/2023   No Show:  No  Date / Time of Next Visit: 7/11/2023   Claim Information   Patient Name: Samia Rodriguez  Claim Number:     Employer: [employer]  Date of Injury: 7/6/2023     Insurer / TPA: [employer] ID / SSN: [id number]    Occupation:  Diagnosis: The encounter diagnosis was Sprain of anterior talofibular ligament of right ankle, initial encounter.    Medical Information   Related to Industrial Injury? Yes   Subjective Complaints:  Ankle pain   Objective Findings: Ankle tenderness   Pre-Existing Condition(s):     Assessment:   Initial Visit    Status: Additional Care Required  Permanent Disability:No    Plan:   Comments:Splint    Diagnostics: X-ray    Comments:  Negative    Disability Information   Status: Released to Restricted Duty    From:  7/7/2023  Through: 7/11/2023 Restrictions are: Temporary   Physical Restrictions   Sitting:  Continuously Standing:  Rarely Stooping:  Rarely Bending:      Squatting:  Rarely Walking:  Rarely Climbing:  Never Pushing:  Never   Pulling:    Other:    Reaching Above Shoulder (L):   Reaching Above Shoulder (R):       Reaching Below Shoulder (L):    Reaching Below Shoulder (R):      Not to exceed Weight Limits   Carrying(hrs):   Weight Limit(lb):   Lifting(hrs):   Weight  Limit(lb):     Comments:      Repetitive Actions   Hands: i.e. Fine Manipulations from Grasping:     Feet: i.e. Operating Foot Controls:     Driving / Operate Machinery:     Physician Name: Kg Ramos Physician Signature: KG Butterfield M.D. e-Signature:  , Medical Director   Clinic Name / Location: [facility], EMERGENCY DEPT  [address] [facility] [address] [phone]     Clinic  Phone Number: Dept: [phone]   Appointment Time:  Visit Start Time:    Check-In Time:  5:14 PM Visit Discharge Time:    Original-Treating Physician or Chiropractor    Page 2-Insurer/TPA    Page 3-Employer    Page 4-Employee

## 2023-07-08 NOTE — ED TRIAGE NOTES
Ambulatory to triage with   Chief Complaint   Patient presents with    Ankle Pain     Right   Injured ankle at work. Requesting imaging. Boot in place from . C/o 3/10 pain.

## 2023-07-08 NOTE — ED PROVIDER NOTES
"ED Provider Note    CHIEF COMPLAINT  Chief Complaint   Patient presents with    Ankle Pain     Right       EXTERNAL RECORDS REVIEWED  Outpatient Notes seen yesterday in occupational health for ankle injury sustained at work.  X-ray was ordered although does not appear to be completed    HPI/ROS  LIMITATION TO HISTORY   Select: : None  OUTSIDE HISTORIAN(S):  none    Samia Rodriguez is a 28 y.o. male who presents with right ankle pain.  Patient sustained an injury at work early yesterday morning, he was using the brake of a med cart using all of his body weights when the cart moved and he had a twisting type injury to his ankle.  He reports isolated tenderness to the ankle itself, no knee or proximal fibula/tibia pain.  No foot pain no focal weakness numbness or tingling.    He was seen at occupational health but they could not perform an x-ray so he was sent here for x-ray    PAST MEDICAL HISTORY       SURGICAL HISTORY  patient denies any surgical history    FAMILY HISTORY  History reviewed. No pertinent family history.    SOCIAL HISTORY  Social History     Tobacco Use    Smoking status: Every Day     Types: Cigarettes     Start date: 2019    Smokeless tobacco: Never   Vaping Use    Vaping Use: Former    Substances: Nicotine, THC    Devices: Disposable   Substance and Sexual Activity    Alcohol use: Not Currently     Comment: Rare use    Drug use: Yes     Types: Marijuana, Inhaled    Sexual activity: Yes     Partners: Female     Birth control/protection: Condom Male       CURRENT MEDICATIONS  Home Medications       Reviewed by Kassidy Lee R.N. (Registered Nurse) on 07/07/23 at 1721  Med List Status: Partial     Medication Last Dose Status   Ascorbic Acid (VITAMIN C) 1000 MG Tab 7/7/2023 Active                    ALLERGIES  No Known Allergies    PHYSICAL EXAM  VITAL SIGNS: /67   Pulse 76   Temp 36.5 °C (97.7 °F) (Temporal)   Resp 18   Ht 1.727 m (5' 8\")   Wt 59 kg (130 lb 1.1 oz)   SpO2 97%   BMI " 19.78 kg/m²      Pulse ox interpretation: I interpret this pulse ox as normal.  Constitutional: Alert in no apparent distress.  HENT: Normocephalic, Atraumatic, Bilateral external ears normal. Nose normal.   Skin: Warm, Dry, No erythema, No rash.   MSK: Tenderness over the right lateral malleolus as well as ATFL, no tenderness over the medial malleolus, no forefoot or midfoot tenderness, negative squeeze, negative drawer, does have some mild tenderness posteriorly over the Achilles insertion, negative Luther test, intact strength with flexion and extension, distal capillary refill less than 2 seconds, distal sensation intact light touch  Neurologic: Alert, Grossly non-focal.   Psychiatric: Affect normal, Judgment normal, Mood normal, Appears appropriate                 DIAGNOSTIC STUDIES / PROCEDURES      RADIOLOGY  I have independently interpreted the diagnostic imaging associated with this visit and am waiting the final reading from the radiologist.   My preliminary interpretation is as follows: no fracture  Radiologist interpretation:   DX-ANKLE 3+ VIEWS RIGHT   Final Result      1.  Unremarkable right ankle series.            COURSE & MEDICAL DECISION MAKING      INITIAL ASSESSMENT, COURSE AND PLAN  Care Narrative: 5:33 PM  Patient presenting with ankle pain after an injury sustained at work.  At this point differential includes but not limited to acute pathology such as fracture, including fracture that may require admission and emergent or urgent surgery, dislocation, strain, sprain and contusion.  Order for x-ray to evaluate.          ADDITIONAL PROBLEM LIST  #Ankle sprain.  No findings of fracture or neurologic vascular compromise.  He already has a walking boot, will follow-up through occupational health  DISPOSITION AND DISCUSSIONS    Barriers to care at this time, including but not limited to:  none .     Decision tools and prescription drugs considered including, but not limited to: Pain Medications  comfortable with over-the-counter medications .      The patient will return for new or worsening symptoms and is stable at the time of discharge.    The patient is referred to a primary physician for blood pressure management, diabetic screening, and for all other preventative health concerns.        DISPOSITION:  Patient will be discharged home in stable condition.    FOLLOW UP:  [place] [address]  [place] [address]  [phone]  On 7/10/2023        OUTPATIENT MEDICATIONS:  New Prescriptions    No medications on file         FINAL DIAGNOSIS  1. Sprain of anterior talofibular ligament of right ankle, initial encounter           Electronically signed by: Urban Ramos M.D., 7/7/2023 5:33 PM

## 2023-07-08 NOTE — ED NOTES
"Pt discharged to home with steady gait.  Pt alert and oriented times 4 on room air.  Pt in possession of belongings.  Pt provided discharge education and information pertaining to medications and follow up appointments.  Pt received copy of discharge instructions and verbalized understanding. Encouraged to follow up with PCP. /67   Pulse 76   Temp 37.8 °C (100 °F) (Temporal)   Resp 16   Ht 1.727 m (5' 8\")   Wt 59 kg (130 lb 1.1 oz)   SpO2 96%   BMI 19.78 kg/m²      "

## 2023-07-09 ENCOUNTER — OCCUPATIONAL MEDICINE (OUTPATIENT)
Dept: URGENT CARE | Facility: CLINIC | Age: 28
End: 2023-07-09
Payer: COMMERCIAL

## 2023-07-09 VITALS
TEMPERATURE: 98.6 F | HEART RATE: 67 BPM | OXYGEN SATURATION: 97 % | BODY MASS INDEX: 19.69 KG/M2 | SYSTOLIC BLOOD PRESSURE: 94 MMHG | HEIGHT: 68 IN | RESPIRATION RATE: 16 BRPM | DIASTOLIC BLOOD PRESSURE: 54 MMHG | WEIGHT: 129.9 LBS

## 2023-07-09 DIAGNOSIS — S93.409D SPRAIN OF ANKLE, SUBSEQUENT ENCOUNTER: ICD-10-CM

## 2023-07-09 PROCEDURE — 3074F SYST BP LT 130 MM HG: CPT | Performed by: NURSE PRACTITIONER

## 2023-07-09 PROCEDURE — 99213 OFFICE O/P EST LOW 20 MIN: CPT | Performed by: NURSE PRACTITIONER

## 2023-07-09 PROCEDURE — 3078F DIAST BP <80 MM HG: CPT | Performed by: NURSE PRACTITIONER

## 2023-07-09 NOTE — LETTER
[place] [id number] [facility] [address] [place] [address]  Phone:  [phone] - Fax:  [phone]   [facility] Progress Report and Disability Certification  Date of Service: 7/9/2023   No Show:  No  Date / Time of Next Visit: 7/16/2023   Claim Information   Patient Name: Samia Rodriguez  Claim Number:     Employer: [employer]  Date of Injury: 7/6/2023     Insurer / TPA: [employer]  ID / SSN:     Occupation:   Diagnosis: The encounter diagnosis was Sprain of ankle, subsequent encounter.    Medical Information   Related to Industrial Injury? Yes    Subjective Complaints:  Patient works as a  at [employer].  He was engaging the brake of a [employer]art that was jammed requiring excess amount of force to dislodge it.  Reports he had to stand on the brake with the ball of his right foot putting all of his body weight on it to actuate it when it suddenly moved down about 3-4 inches.  Felt sudden pain at the right anterior, anterolateral, and posterior ankle.  Worsens with weight bearing and flexion/dorsiflexion of the foot.  Was evaluated by care on site.  Has been icing and using elastic bandage.  Reports previous history of right ankle sprain.  No second job.    Follow up #1 : YANDY Rodriguez is a 28 y.o. male who presents with right ankle pain.  Patient sustained an injury at work early yesterday morning, he was using the brake of a med cart using all of his body weights when the cart moved and he had a twisting type injury to his ankle.  He reports isolated tenderness to the ankle itself, no knee or proximal fibula/tibia pain.  No foot pain no focal weakness numbness or tingling.     He was seen at occupational health but they could not perform an x-ray so he was sent here for x-ray      Follow up #2: X-ray negative for fracture.  His ankle pain is progressively improving.  Current pain level today is a 3/10.  He is continuing to wear  Discharge Summary dictated.   his Ace wrap and walking boot.  He is adhering to his work restrictions.  He does endorse pain with dorsiflexion of right foot and inversion.  He has not been using any over-the-counter pain relievers for his symptoms.  He has been using ice and elevation.   Objective Findings:  Right ankle: No swelling, ecchymosis or lacerations. Tenderness present over the lateral malleolus and base of 5th metatarsal. Decreased range of motion.      Right Achilles Tendon: No tenderness.      Pre-Existing Condition(s):     Assessment:   Condition Improved    Status: Additional Care Required  Permanent Disability:No    Plan:      Diagnostics:      Comments:       Disability Information   Status: Released to Restricted Duty    From:  2023  Through: 2023 Restrictions are:     Physical Restrictions   Sitting:    Standing:    Stooping:    Bending:      Squatting:    Walking:  < or = to 4 hrs/day Climbin hrs/day Pushin hrs/day   Pullin hrs/day Other:    Reaching Above Shoulder (L):   Reaching Above Shoulder (R):       Reaching Below Shoulder (L):    Reaching Below Shoulder (R):      Not to exceed Weight Limits   Carrying(hrs):   Weight Limit(lb): < or = to 10 pounds Lifting(hrs):   Weight  Limit(lb): < or = to 10 pounds   Comments: Please allow him to wear short walking boot while at work.    Repetitive Actions   Hands: i.e. Fine Manipulations from Grasping:     Feet: i.e. Operating Foot Controls:     Driving / Operate Machinery:     Health Care Provider’s Original or Electronic Signature  BENNIE Barros Health Care Provider’s Original or Electronic Signature    Yadiel Samano DO MPH     Clinic Name / Location: [address] [address] Clinic Phone Number: Dept: [phone]   Appointment Time: 6:00 Pm Visit Start Time: 5:41 PM   Check-In Time:  5:33 Pm Visit Discharge Time:     Original-Treating Physician or Chiropractor    Page 2-Insurer/TPA    Page 3-Employer     Page 4-Employee

## 2023-07-10 NOTE — PROGRESS NOTES
Subjective:     Samia Rodriguez is a 28 y.o. male who presents for Follow-Up ([facility] DOI 7-6-23, right ankle injury. Feels good in boot, but not when moving outside of boot)      HPI  Pt presents for evaluation of an existing work comp injury.  Copied from previous visit:  DOI: 7/6/2023 @ 3:35 AM. Initial visit.     Patient works as a  at [employer].  He was engaging the brake of a [employer] that was jammed requiring excess amount of force to dislodge it.  Reports he had to stand on the brake with the ball of his right foot putting all of his body weight on it to actuate it when it suddenly moved down about 3-4 inches.  Felt sudden pain at the right anterior, anterolateral, and posterior ankle.  Worsens with weight bearing and flexion/dorsiflexion of the foot.  Was evaluated by care on site.  Has been icing and using elastic bandage.  Reports previous history of right ankle sprain.  No second job.    Follow up #1 : ER Samia Rodriguez is a 28 y.o. male who presents with right ankle pain.  Patient sustained an injury at work early yesterday morning, he was using the brake of a med cart using all of his body weights when the cart moved and he had a twisting type injury to his ankle.  He reports isolated tenderness to the ankle itself, no knee or proximal fibula/tibia pain.  No foot pain no focal weakness numbness or tingling.     He was seen at occupational health but they could not perform an x-ray so he was sent here for x-ray      Follow up #2: X-ray negative for fracture.  His ankle pain is progressively improving.  Current pain level today is a 3/10.  He is continuing to wear his Ace wrap and walking boot.  He is adhering to his work restrictions.  He does endorse pain with dorsiflexion of right foot and inversion.  He has not been using any over-the-counter pain relievers for his symptoms.  He has been using ice and elevation.     ROS    PMH: No past medical history on file.  ALLERGIES: No Known  "Allergies  SURGHX: No past surgical history on file.  SOCHX:   Social History     Socioeconomic History    Marital status: Single   Tobacco Use    Smoking status: Every Day     Types: Cigarettes     Start date: 2019    Smokeless tobacco: Never    Tobacco comments:     Less than a cig a day   Vaping Use    Vaping Use: Every day    Substances: Nicotine, THC    Devices: Disposable   Substance and Sexual Activity    Alcohol use: Not Currently     Comment: Rare use    Drug use: Yes     Types: Marijuana     Comment: marijuana daily    Sexual activity: Yes     Partners: Female     Birth control/protection: Condom Male     FH: No family history on file.      Objective:   BP 94/54   Pulse 67   Temp 37 °C (98.6 °F) (Temporal)   Resp 16   Ht 1.727 m (5' 8\") Comment: per pt  Wt 58.9 kg (129 lb 14.4 oz) Comment: w shoes  SpO2 97%   BMI 19.75 kg/m²     Physical Exam  Vitals and nursing note reviewed.   Constitutional:       General: He is not in acute distress.     Appearance: Normal appearance. He is normal weight. He is not ill-appearing or toxic-appearing.   HENT:      Head: Normocephalic.      Right Ear: External ear normal.      Left Ear: External ear normal.      Nose: Nose normal.      Mouth/Throat:      Mouth: Mucous membranes are moist.   Eyes:      General:         Right eye: No discharge.         Left eye: No discharge.      Extraocular Movements: Extraocular movements intact.      Conjunctiva/sclera: Conjunctivae normal.      Pupils: Pupils are equal, round, and reactive to light.   Pulmonary:      Effort: Pulmonary effort is normal.      Breath sounds: Normal breath sounds.   Abdominal:      General: Abdomen is flat.   Musculoskeletal:      Cervical back: Normal range of motion and neck supple. No rigidity.      Right ankle: No swelling, ecchymosis or lacerations. Tenderness present over the lateral malleolus and base of 5th metatarsal. Decreased range of motion.      Right Achilles Tendon: No tenderness. "   Lymphadenopathy:      Cervical: No cervical adenopathy.   Skin:     General: Skin is warm and dry.   Neurological:      General: No focal deficit present.      Mental Status: He is alert and oriented to person, place, and time. Mental status is at baseline.   Psychiatric:         Mood and Affect: Mood normal.         Behavior: Behavior normal.         Judgment: Judgment normal.         Assessment/Plan:   Assessment    1. Sprain of ankle, subsequent encounter        Continue with set work restrictions.  Tylenol/Ibuprofen as needed for discomfort.  Pt was encouraged to rest, ice 20 minutes 3 times daily for 3 days, elevate at heart level and compress with Ace wrap.  Gentle ROM exercises encouraged.  Questions/concerns addressed.  Patient to follow-up in 7 days or sooner for worsening symptoms.  AVS handout given and reviewed with patient. Pt educated on red flags and when to seek treatment back in ER or UC.

## 2023-07-16 ENCOUNTER — OCCUPATIONAL MEDICINE (OUTPATIENT)
Dept: URGENT CARE | Facility: CLINIC | Age: 28
End: 2023-07-16
Payer: COMMERCIAL

## 2023-07-16 VITALS
RESPIRATION RATE: 12 BRPM | WEIGHT: 134.2 LBS | SYSTOLIC BLOOD PRESSURE: 98 MMHG | TEMPERATURE: 99.2 F | HEIGHT: 68 IN | OXYGEN SATURATION: 97 % | HEART RATE: 73 BPM | DIASTOLIC BLOOD PRESSURE: 60 MMHG | BODY MASS INDEX: 20.34 KG/M2

## 2023-07-16 DIAGNOSIS — S93.409D SPRAIN OF ANKLE, SUBSEQUENT ENCOUNTER: ICD-10-CM

## 2023-07-16 PROCEDURE — 99213 OFFICE O/P EST LOW 20 MIN: CPT | Performed by: FAMILY MEDICINE

## 2023-07-16 PROCEDURE — 3074F SYST BP LT 130 MM HG: CPT | Performed by: FAMILY MEDICINE

## 2023-07-16 PROCEDURE — 3078F DIAST BP <80 MM HG: CPT | Performed by: FAMILY MEDICINE

## 2023-07-16 ASSESSMENT — ENCOUNTER SYMPTOMS: FEVER: 0

## 2023-07-16 NOTE — LETTER
[facility] [address] [place] [address]  Phone:  [phone] - Fax:  [phone]   [facility] Progress Report and Disability Certification  Date of Service: 7/16/2023   No Show:  No  Date / Time of Next Visit: 7/23/2023   Claim Information   Patient Name: Samia Rodriguez  Claim Number:     Employer: [employer]  Date of Injury: 7/6/2023     Insurer / TPA: [employer]  ID / SSN:     Occupation:   Diagnosis: The encounter diagnosis was Sprain of ankle, subsequent encounter.    Medical Information   Related to Industrial Injury? Yes    Subjective Complaints:  Pt presents for follow-up  DOI: 7/6/2023  VANDA: He was engaging the brake of a Mega[employer]art that was jammed requiring excess amount of force to dislodge it.   Had x-ray of ankle which did not show fracture or acute abnormality  Making improvements   Hs had some increased pain in the dorsal midfoot   Working on ROM exercises at home   Pain is worse with dorsiflexion   Pain is mostly anterior ankle    Objective Findings: Right ankle/foot:  Appearance: No bruising, erythema, or deformity appreciated  Palpation: +TTP along the anterior joint line.  No TTP along medial malleolus or deltoid ligament.  No TTP of lateral malleolus, ATFL, CFL, or PTFL.  No TTP along midfoot, base of the 5th metatarsal, MTP joints, or toes.   ROM: FROM throughout  Special testing: Neg squeeze test, neg drawer test, neg talar tilt, no pain/click with Lm's   Neurovascular: 2+ dorsalis pedis and posterior tibial.  Sensation intact    Pre-Existing Condition(s):     Assessment:   Condition Improved    Status: Additional Care Required  Permanent Disability:No    Plan:      Diagnostics:      Comments:       Disability Information   Status: Released to Restricted Duty    From:  7/16/2023  Through: 7/23/2023 Restrictions are: Temporary   Physical Restrictions   Sitting:    Standing:    Stooping:    Bending:       Squatting:    Walking:    Climbing:    Pushing:      Pulling:    Other:    Reaching Above Shoulder (L):   Reaching Above Shoulder (R):       Reaching Below Shoulder (L):    Reaching Below Shoulder (R):      Not to exceed Weight Limits   Carrying(hrs):   Weight Limit(lb): < or = to 10 pounds Lifting(hrs):   Weight  Limit(lb): < or = to 10 pounds   Comments: Must wear boot at work.  May not be up on feet more than 4 hours per day.      Repetitive Actions   Hands: i.e. Fine Manipulations from Grasping:     Feet: i.e. Operating Foot Controls:     Driving / Operate Machinery:     Health Care Provider’s Original or Electronic Signature  Murray Phillips M.D. Health Care Provider’s Original or Electronic Signature    Yadiel Samano DO MPH     Clinic Name / Location: [address] [address] Clinic Phone Number: Dept: [phone]   Appointment Time: 6:00 Pm Visit Start Time: 6:27 PM   Check-In Time:  6:08 Pm Visit Discharge Time:     Original-Treating Physician or Chiropractor    Page 2-Insurer/TPA    Page 3-Employer    Page 4-Employee

## 2023-07-17 NOTE — PROGRESS NOTES
"Subjective:     Samia Rodriguez is a 28 y.o. male who presents for Follow-Up ( [facility] DOI 7-6-23 right ankle injury)    HPI  Pt presents for follow-up  DOI: 7/6/2023  VANDA: He was engaging the brake of a Mega[employer]art that was jammed requiring excess amount of force to dislodge it.   Had x-ray of ankle which did not show fracture or acute abnormality  Making improvements   Hs had some increased pain in the dorsal midfoot   Working on ROM exercises at home   Pain is worse with dorsiflexion   Pain is mostly anterior ankle     Review of Systems   Constitutional:  Negative for fever.   Skin:  Negative for rash.     PMH: Past medical history reviewed in Epic  MEDS: Medications were reviewed in Epic  ALLERGIES: Allergies were reviewed in Epic     Objective:   BP 98/60 (BP Location: Left arm, Patient Position: Sitting, BP Cuff Size: Large adult)   Pulse 73   Temp 37.3 °C (99.2 °F) (Temporal)   Resp 12   Ht 1.727 m (5' 8\") Comment: per pt  Wt 60.9 kg (134 lb 3.2 oz) Comment: w shoes and boot  SpO2 97%   BMI 20.41 kg/m²     Physical Exam  Constitutional:       General: He is not in acute distress.     Appearance: He is well-developed. He is not diaphoretic.   Pulmonary:      Effort: Pulmonary effort is normal.   Neurological:      Mental Status: He is alert.     Right ankle/foot:  Appearance: No bruising, erythema, or deformity appreciated  Palpation: +TTP along the anterior joint line.  No TTP along medial malleolus or deltoid ligament.  No TTP of lateral malleolus, ATFL, CFL, or PTFL.  No TTP along midfoot, base of the 5th metatarsal, MTP joints, or toes.   ROM: FROM throughout  Special testing: Neg squeeze test, neg drawer test, neg talar tilt, no pain/click with Lm's   Neurovascular: 2+ dorsalis pedis and posterior tibial.  Sensation intact     Assessment/Plan:   Assessment    1. Sprain of ankle, subsequent encounter  - Referral to Occupational Medicine    Patient with ankle sprain.  Making improvements.  Recommended " continuing walking boot for at least 1 more week.  Reviewed some home exercises that he can start working on.  Follow-up with occupational medicine.  D39 given.

## 2023-08-04 ENCOUNTER — OCCUPATIONAL MEDICINE (OUTPATIENT)
Dept: OCCUPATIONAL MEDICINE | Facility: CLINIC | Age: 28
End: 2023-08-04
Payer: COMMERCIAL

## 2023-08-04 VITALS
TEMPERATURE: 97.9 F | WEIGHT: 134 LBS | BODY MASS INDEX: 20.31 KG/M2 | DIASTOLIC BLOOD PRESSURE: 72 MMHG | SYSTOLIC BLOOD PRESSURE: 120 MMHG | OXYGEN SATURATION: 97 % | HEIGHT: 68 IN | HEART RATE: 110 BPM | RESPIRATION RATE: 18 BRPM

## 2023-08-04 DIAGNOSIS — S93.401D SPRAIN OF RIGHT ANKLE, UNSPECIFIED LIGAMENT, SUBSEQUENT ENCOUNTER: ICD-10-CM

## 2023-08-04 PROCEDURE — 3078F DIAST BP <80 MM HG: CPT | Performed by: FAMILY MEDICINE

## 2023-08-04 PROCEDURE — 3074F SYST BP LT 130 MM HG: CPT | Performed by: FAMILY MEDICINE

## 2023-08-04 PROCEDURE — 99213 OFFICE O/P EST LOW 20 MIN: CPT | Performed by: FAMILY MEDICINE

## 2023-08-04 ASSESSMENT — ENCOUNTER SYMPTOMS
FOCAL WEAKNESS: 0
SENSORY CHANGE: 0

## 2023-08-04 NOTE — PROGRESS NOTES
"Subjective     Samia Rodriguez is a 28 y.o. male who presents with Follow-Up ([facility] DOI 7/6/23 (rt) foot, rm 19)      DOI: 7/6/2023 @ 3:35 AM  Per initial visit:   \"Patient works as a  at [employer].  He was engaging the brake of a Mega[employer]art that was jammed requiring excess amount of force to dislodge it.  Reports he had to stand on the brake with the ball of his right foot putting all of his body weight on it to actuate it when it suddenly moved down about 3-4 inches.  Felt sudden pain at the right anterior, anterolateral, and posterior ankle.\"    Today: overall feels 75-80% improved. He has intermittent pain to dorsum of foot. Ambulatory without limp.      HPI    Review of Systems   Skin:  Negative for itching and rash.   Neurological:  Negative for sensory change and focal weakness.              Objective     /72   Pulse (!) 110   Temp 36.6 °C (97.9 °F)   Resp 18   Ht 1.727 m (5' 8\")   Wt 60.8 kg (134 lb)   SpO2 97%   BMI 20.37 kg/m²      Physical Exam  Constitutional:       Appearance: Normal appearance.   Neurological:      Mental Status: He is alert.      Gait: Gait normal.         Right ankle: no point tenderness, full range of motion, stable.  Foot: dorsal pain reproduced with lateral rotation against resistance. No point tenderness or deformity. Distal neuro/vascular intact.                      Assessment & Plan   Urgent care visit 7/6/2023 reviewed  Ankle x-ray 7/7/2023 reviewed     1. Sprain of right ankle, unspecified ligament, subsequent encounter    Slowly improving.  Will discontinue boot and advance work restrictions.  Follow-up in 1 week.                  "

## 2023-08-04 NOTE — LETTER
"[address],   [address] [address]  Phone:  [phone] - Fax:  [phone]   [facility] Progress Report and Disability Certification  Date of Service: 8/4/2023   No Show:  No  Date / Time of Next Visit: 08/10/2023 AT 7:45 AM   Claim Information   Patient Name: Samia Rodriguez  Claim Number:     Employer: [employer]  Date of Injury: 7/6/2023     Insurer / TPA: [employer]  ID / SSN:     Occupation:   Diagnosis: The encounter diagnosis was Sprain of right ankle, unspecified ligament, subsequent encounter.    Medical Information   Related to Industrial Injury? Yes    Subjective Complaints:  DOI: 7/6/2023 @ 3:35 AM  Per initial visit:   \"Patient works as a  at [employer].  He was engaging the brake of a MegaKart that was jammed requiring excess amount of force to dislodge it.  Reports he had to stand on the brake with the ball of his right foot putting all of his body weight on it to actuate it when it suddenly moved down about 3-4 inches.  Felt sudden pain at the right anterior, anterolateral, and posterior ankle.\"    Today: overall feels 75-80% improved. He has intermittent pain to dorsum of foot. Ambulatory without limp.    Objective Findings: Right ankle: no point tenderness, full range of motion, stable.  Foot: dorsal pain reproduced with lateral rotation against resistance. No point tenderness or deformity. Distal neuro/vascular intact.      Pre-Existing Condition(s):     Assessment:   Condition Improved    Status: Additional Care Required  Permanent Disability:No    Plan:   Comments:discontinue boot, advance work restriction, ice and nsaid as needed    Diagnostics:      Comments:       Disability Information   Status: Released to Restricted Duty    From:  8/4/2023  Through: 8/11/2023 Restrictions are: Temporary   Physical Restrictions   Sitting:    Standing:    Stooping:    Bending:    "   Squatting:    Walking:    Climbing:    Pushing:      Pulling:    Other:    Reaching Above Shoulder (L):   Reaching Above Shoulder (R):       Reaching Below Shoulder (L):    Reaching Below Shoulder (R):      Not to exceed Weight Limits   Carrying(hrs):   Weight Limit(lb):   Lifting(hrs):   Weight  Limit(lb):     Comments: Limit time on feet to less than 6 hours    Repetitive Actions   Hands: i.e. Fine Manipulations from Grasping:     Feet: i.e. Operating Foot Controls:     Driving / Operate Machinery: < or = to 4 hrs/day   Health Care Provider’s Original or Electronic Signature  Jacob Sheth M.D. Health Care Provider’s Original or Electronic Signature    Yadiel Samano DO MPH     Clinic Name / Location: [address],   [address] [facility]  [place] [address] Clinic Phone Number: Dept: [phone]   Appointment Time: 2:30 Pm Visit Start Time: 2:33 PM   Check-In Time:  2:27 Pm Visit Discharge Time:  3:19 PM

## 2023-08-10 ENCOUNTER — OCCUPATIONAL MEDICINE (OUTPATIENT)
Dept: OCCUPATIONAL MEDICINE | Facility: CLINIC | Age: 28
End: 2023-08-10
Payer: COMMERCIAL

## 2023-08-10 VITALS
DIASTOLIC BLOOD PRESSURE: 70 MMHG | OXYGEN SATURATION: 100 % | BODY MASS INDEX: 20.31 KG/M2 | RESPIRATION RATE: 14 BRPM | WEIGHT: 134 LBS | TEMPERATURE: 98.4 F | HEART RATE: 69 BPM | HEIGHT: 68 IN | SYSTOLIC BLOOD PRESSURE: 118 MMHG

## 2023-08-10 DIAGNOSIS — S93.401D SPRAIN OF RIGHT ANKLE, UNSPECIFIED LIGAMENT, SUBSEQUENT ENCOUNTER: ICD-10-CM

## 2023-08-10 PROCEDURE — 99203 OFFICE O/P NEW LOW 30 MIN: CPT | Performed by: PREVENTIVE MEDICINE

## 2023-08-10 PROCEDURE — 1126F AMNT PAIN NOTED NONE PRSNT: CPT | Performed by: PREVENTIVE MEDICINE

## 2023-08-10 PROCEDURE — 3078F DIAST BP <80 MM HG: CPT | Performed by: PREVENTIVE MEDICINE

## 2023-08-10 PROCEDURE — 3074F SYST BP LT 130 MM HG: CPT | Performed by: PREVENTIVE MEDICINE

## 2023-08-10 ASSESSMENT — PAIN SCALES - GENERAL: PAINLEVEL: NO PAIN

## 2023-08-10 NOTE — PROGRESS NOTES
"Subjective:     Samia Rodriguez is a 28 y.o. 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  He was engaging the brake of a MegaKart that was jammed requiring excess amount of force to dislodge it.  He was seen in urgent care x 3, x-rays were negative for acute findings.  He was provided a walking boot.  Patient feels overall has had good improvement in symptoms.  He states has been walking without the boot to do small Arons and has not had any pain outside of the boot.  He feels comfortable trying full duty for few shifts to see how it goes.    ROS: All systems were reviewed on intake form, form was reviewed and signed. See scanned documents in media. Pertinent positives and negatives included in HPI.    PMH: No pertinent past medical history to this problem  MEDS: Medications were reviewed in Epic  ALLERGIES: No Known Allergies  SOCHX: Works as a  at [employer]  FH: No pertinent family history to this problem       Objective:     /70   Pulse 69   Temp 36.9 °C (98.4 °F) (Temporal)   Resp 14   Ht 1.727 m (5' 8\")   Wt 60.8 kg (134 lb)   SpO2 100%   BMI 20.37 kg/m²     Constitutional: Patient is in no acute distress. Appears well-developed and well-nourished.   HENT: Normocephalic and atraumatic. EOM are normal. No scleral icterus.   Cardiovascular: Normal rate.    Pulmonary/Chest: Effort normal. No respiratory distress.   Neurological: Patient is alert and oriented to person, place, and time.   Skin: Skin is warm and dry.   Psychiatric: Normal mood and affect. Behavior is normal.     Right ankle/foot: No gross deformity.  Area of pain over the dorsum of the foot.  Full range of motion without pain or difficulty normal gait.    Assessment/Plan:       1. Sprain of right ankle, unspecified ligament, subsequent encounter    Released to Full Duty FROM 8/10/2023 TO 8/21/2023     Discontinue " walking boot  OTC ibuprofen/Tylenol as needed  Trial of full duty  Follow-up 1.5 weeks    Differential diagnosis, natural history, supportive care, and indications for immediate follow-up discussed.    Approximately 35 minutes were spent in reviewing notes, preparing for visit, obtaining history, exam and evaluation, patient counseling/education and post visit documentation/orders.

## 2023-08-10 NOTE — LETTER
[address],   [address] [place] [address]  Phone:  [phone] - Fax:  [phone]   [facility] Progress Report and Disability Certification  Date of Service: 8/10/2023   No Show:  No  Date / Time of Next Visit: 8/21/2023 AT 4:15 PM   Claim Information   Patient Name: Samia Rodriguez  Claim Number:     Employer: [employer]  Date of Injury: 7/6/2023     Insurer / TPA: [employer]  ID / SSN:     Occupation:   Diagnosis: The encounter diagnosis was Sprain of right ankle, unspecified ligament, subsequent encounter.    Medical Information   Related to Industrial Injury? 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  He was engaging the brake of a MegaKart that was jammed requiring excess amount of force to dislodge it.  He was seen in urgent care x 3, x-rays were negative for acute findings.  He was provided a walking boot.  Patient feels overall has had good improvement in symptoms.  He states has been walking without the boot to do small Arons and has not had any pain outside of the boot.  He feels comfortable trying full duty for few shifts to see how it goes.   Objective Findings: Right ankle/foot: No gross deformity.  Area of pain over the dorsum of the foot.  Full range of motion without pain or difficulty normal gait.   Pre-Existing Condition(s):     Assessment:   Condition Improved    Status: Additional Care Required  Permanent Disability:No    Plan:      Diagnostics:      Comments:  Discontinue walking boot  OTC ibuprofen/Tylenol as needed  Trial of full duty  Follow-up 1.5 weeks    Disability Information   Status: Released to Full Duty    From:  8/10/2023  Through: 8/21/2023 Restrictions are:     Physical Restrictions   Sitting:    Standing:    Stooping:    Bending:      Squatting:    Walking:    Climbing:    Pushing:      Pulling:    Other:    Reaching  Above Shoulder (L):   Reaching Above Shoulder (R):       Reaching Below Shoulder (L):    Reaching Below Shoulder (R):      Not to exceed Weight Limits   Carrying(hrs):   Weight Limit(lb):   Lifting(hrs):   Weight  Limit(lb):     Comments:      Repetitive Actions   Hands: i.e. Fine Manipulations from Grasping:     Feet: i.e. Operating Foot Controls:     Driving / Operate Machinery:     Health Care Provider’s Original or Electronic Signature  Yadiel Samano D.O. Health Care Provider’s Original or Electronic Signature    Yadiel Samano DO MPH     Clinic Name / Location: [address],   [address]  [place] [address] Clinic Phone Number: Dept: [phone]   Appointment Time: 7:45 Am Visit Start Time: 7:39 AM   Check-In Time:  7:36 Am Visit Discharge Time:  8:07 AM   Original-Treating Physician or Chiropractor    Page 2-Insurer/TPA    Page 3-Employer    Page 4-Employee